# Patient Record
Sex: MALE | Race: BLACK OR AFRICAN AMERICAN | NOT HISPANIC OR LATINO | Employment: UNEMPLOYED | URBAN - METROPOLITAN AREA
[De-identification: names, ages, dates, MRNs, and addresses within clinical notes are randomized per-mention and may not be internally consistent; named-entity substitution may affect disease eponyms.]

---

## 2017-04-26 ENCOUNTER — HOSPITAL ENCOUNTER (EMERGENCY)
Facility: HOSPITAL | Age: 22
Discharge: HOME | End: 2017-04-26
Attending: EMERGENCY MEDICINE
Payer: MEDICAID

## 2017-04-26 VITALS
OXYGEN SATURATION: 97 % | HEART RATE: 69 BPM | BODY MASS INDEX: 39.17 KG/M2 | WEIGHT: 315 LBS | DIASTOLIC BLOOD PRESSURE: 83 MMHG | RESPIRATION RATE: 19 BRPM | HEIGHT: 75 IN | TEMPERATURE: 99 F | SYSTOLIC BLOOD PRESSURE: 144 MMHG

## 2017-04-26 DIAGNOSIS — R51.9 HEADACHE IN FRONT OF HEAD: ICD-10-CM

## 2017-04-26 DIAGNOSIS — J02.9 ACUTE PHARYNGITIS, UNSPECIFIED ETIOLOGY: Primary | ICD-10-CM

## 2017-04-26 PROCEDURE — 99283 EMERGENCY DEPT VISIT LOW MDM: CPT

## 2017-04-26 RX ORDER — IBUPROFEN 600 MG/1
600 TABLET ORAL EVERY 6 HOURS PRN
Qty: 20 TABLET | Refills: 0 | OUTPATIENT
Start: 2017-04-26 | End: 2018-09-30

## 2017-04-26 RX ORDER — DIPHENHYDRAMINE HCL 25 MG
25 CAPSULE ORAL EVERY 6 HOURS PRN
Qty: 20 CAPSULE | Refills: 0 | COMMUNITY
Start: 2017-04-26 | End: 2017-04-26

## 2017-04-26 RX ORDER — DIPHENHYDRAMINE HCL 25 MG
25 CAPSULE ORAL EVERY 6 HOURS PRN
Qty: 20 CAPSULE | Refills: 0 | Status: SHIPPED | OUTPATIENT
Start: 2017-04-26

## 2017-04-26 NOTE — ED TRIAGE NOTES
Patient comes to the ER with sore throat. Patient states been going on since last night. Frontal region headache.

## 2017-04-26 NOTE — ED PROVIDER NOTES
Encounter Date: 4/26/2017       History     Chief Complaint   Patient presents with    Sore Throat     and headache since last night     Review of patient's allergies indicates:  No Known Allergies  HPI   This 21-year-old male presents to emergency room complaining of a sore throat that began last night.  He also has some pain in the front of his head.  He denies fever there is no history of cough rhinorrhea.  He is without other injuries or problems.  The symptoms are moderately severe and constant.  Past Medical History:   Diagnosis Date    ADHD (attention deficit hyperactivity disorder)     Obesity      History reviewed. No pertinent surgical history.  History reviewed. No pertinent family history.  Social History   Substance Use Topics    Smoking status: Never Smoker    Smokeless tobacco: None    Alcohol use No     Review of Systems  The patient was questioned specifically with regard to the following.  General: Fever, chills, sweats. Neuro: Headache. Eyes: eye problems. ENT: Ear pain, sore throat. Cardiovascular: Chest pain. Respiratory: Cough, shortness of breath. Gastrointestinal: Abdominal pain, vomiting, diarrhea. Genitourinary: Painful urination.  Musculoskeletal: Arm and leg problems. Skin: Rash.  The review of systems was negative except for the following: Sore throat, frontal headache, no neurologic deficits neck pain  Physical Exam   Initial Vitals   BP Pulse Resp Temp SpO2   04/26/17 1714 04/26/17 1714 04/26/17 1714 04/26/17 1714 04/26/17 1714   144/83 69 19 98.9 °F (37.2 °C) 97 %     Physical Exam  The patient was examined specifically for the following:   General:No significant distress, Good color, Warm and dry. Head and neck:Scalp atraumatic, Neck supple. Neurological:Appropriate conversation, Gross motor deficits. Eyes:Conjugate gaze, Clear corneas. ENT: No epistaxis. Cardiac: Regular rate and rhythm, Grossly normal heart tones. Pulmonary: Wheezing, Rales. Gastrointestinal: Abdominal  tenderness, Abdominal distention. Musculoskeletal: Extremity deformity, Apparent pain with range of motion of the joints. Skin: Rash.   The findings on examination were normal except for the following: The pharynx is slightly red.  There are no exudates.  There is no adenopathy.  The lungs are clear the heart tones are normally abdomen is soft.  The neck is supple.  Mental status examination, cranial nerves, motor and sensory examination are normal.  ED Course   Procedures  Labs Reviewed - No data to display       Medical decision making: Given the above I believe this patient is a viral pharyngitis.  I will treat with ibuprofen and throat lozenges.  I will have him follow up with primary care.  I'll advise lots of liquids.  We will have him return if he gets worse or if new problems develop.  I'll also use Benadryl.  I doubt streptococcal pharyngitis.                       ED Course     Clinical Impression:   The primary encounter diagnosis was Acute pharyngitis, unspecified etiology. A diagnosis of Headache in front of head was also pertinent to this visit.          Trent Santana MD  04/26/17 2034

## 2017-04-26 NOTE — ED AVS SNAPSHOT
[facility]  [place] [address]               Jesus Bishop   2017  5:23 PM   ED    Description:  Male : 1995   Department:  [facility]           Your Care was Coordinated By:     Provider Role From To    Trent Santana MD Attending Provider [id number] --      Reason for Visit     Sore Throat           Diagnoses this Visit        Comments    Acute pharyngitis, unspecified etiology    -  Primary     Headache in front of head           ED Disposition     None           To Do List           Follow-up Information     Follow up with Damian Howell MD In 3 days.    Specialty:  Pediatrics    Contact information:    [address] [place] [address] [address]  [address]  [place] [address]  [phone]         These Medications        Disp Refills Start End    ibuprofen (ADVIL,MOTRIN) 600 MG tablet 20 tablet 0 2017     Take 1 tablet (600 mg total) by mouth every 6 (six) hours as needed for Pain. - Oral      PURCHASE these Medications (No prescription required)        Start End    diphenhydrAMINE (BENADRYL) 25 mg capsule 2017     Sig - Route: Take 1 each (25 mg total) by mouth every 6 (six) hours as needed (Sore throat). - Oral    Class: OTC      [facility] On Call     [facility] On Call Nurse Care Line - 24/7 Assistance  Unless otherwise directed by your provider, please contact [facility], our nurse care line that is available for 24/7 assistance.     Registered nurses in the [facility] provide: appointment scheduling, clinical advisement, health education, and other advisory services.  Call: [phone] (toll free)               Medications           Message regarding Medications     Verify the changes and/or additions to your medication regime listed below are the same as discussed with your clinician today.  If any of these changes or additions are incorrect, please notify your healthcare provider.        START  "taking these NEW medications        Refills    ibuprofen (ADVIL,MOTRIN) 600 MG tablet 0    Sig: Take 1 tablet (600 mg total) by mouth every 6 (six) hours as needed for Pain.    Class: Print    Route: Oral    diphenhydrAMINE (BENADRYL) 25 mg capsule 0    Sig: Take 1 each (25 mg total) by mouth every 6 (six) hours as needed (Sore throat).    Class: OTC    Route: Oral      STOP taking these medications     naproxen (NAPROSYN) 500 MG tablet Take 1 tablet (500 mg total) by mouth 2 (two) times daily with meals.           Verify that the below list of medications is an accurate representation of the medications you are currently taking.  If none reported, the list may be blank. If incorrect, please contact your healthcare provider. Carry this list with you in case of emergency.           Current Medications     diphenhydrAMINE (BENADRYL) 25 mg capsule Take 1 each (25 mg total) by mouth every 6 (six) hours as needed (Sore throat).    ibuprofen (ADVIL,MOTRIN) 600 MG tablet Take 1 tablet (600 mg total) by mouth every 6 (six) hours as needed for Pain.           Clinical Reference Information           Your Vitals Were     BP Pulse Temp Resp Height Weight    144/83 (BP Location: Right arm, Patient Position: Sitting) 69 98.9 °F (37.2 °C) (Oral) 19 6' 3" (1.905 m) 145.2 kg (320 lb)    SpO2 BMI             97% 40 kg/m2         Allergies as of 4/26/2017     No Known Allergies      Immunizations Administered on Date of Encounter - 4/26/2017     None      ED Micro, Lab, POCT     None      ED Imaging Orders     None        Discharge Instructions       Lots of liquids.  Please return immediately if you get worse or if new problems develop.  Please make an appointment to see your primary care doctor this week.  Rest.  Use also throat lozenges.    [facility] Sign-Up     Activating your [facility] account is as easy as 1-2-3!     1) Visit [email or web address], select Sign Up Now, enter this activation code and your date of birth, then select " Next.  [email or web address]  Expires: 6/10/2017  5:28 PM      2) Create a username and password to use when you visit [facility] in the future and select a security question in case you lose your password and select Next.    3) Enter your e-mail address and click Sign Up!    Additional Information  If you have questions, please e-mail [email or web address] or call [phone] to talk to our [employer] staff. Remember, [facility] is NOT to be used for urgent needs. For medical emergencies, dial 911.          [facility] complies with applicable Federal civil rights laws and does not discriminate on the basis of race, color, national origin, age, disability, or sex.        Language Assistance Services     ATTENTION: Language assistance services are available, free of charge. Please call [phone].      ATENCIÓN: Si habla español, tiene a small disposición servicios gratuitos de asistencia lingüística. Llame al [phone].     [facility] Ý: N?u b?n nói Ti?ng Vi?t, có các d?ch v? h? tr? ngôn ng? mi?n phí dành cho b?n. G?i s? [phone].

## 2017-04-26 NOTE — DISCHARGE INSTRUCTIONS
Lots of liquids.  Please return immediately if you get worse or if new problems develop.  Please make an appointment to see your primary care doctor this week.  Rest.  Use also throat lozenges.

## 2017-12-12 ENCOUNTER — HOSPITAL ENCOUNTER (EMERGENCY)
Facility: HOSPITAL | Age: 22
Discharge: LEFT BEFORE DISCHARGE | End: 2017-12-12
Attending: EMERGENCY MEDICINE
Payer: MEDICAID

## 2017-12-12 VITALS
HEART RATE: 53 BPM | OXYGEN SATURATION: 98 % | HEIGHT: 75 IN | WEIGHT: 284 LBS | BODY MASS INDEX: 35.31 KG/M2 | TEMPERATURE: 98 F | RESPIRATION RATE: 18 BRPM | SYSTOLIC BLOOD PRESSURE: 138 MMHG | DIASTOLIC BLOOD PRESSURE: 85 MMHG

## 2017-12-12 DIAGNOSIS — M25.511 SHOULDER PAIN, RIGHT: ICD-10-CM

## 2017-12-12 PROCEDURE — 99900041 HC LEFT WITHOUT BEING SEEN- EMERGENCY

## 2018-09-30 ENCOUNTER — HOSPITAL ENCOUNTER (EMERGENCY)
Facility: HOSPITAL | Age: 23
Discharge: HOME | End: 2018-09-30
Attending: EMERGENCY MEDICINE
Payer: MEDICAID

## 2018-09-30 VITALS
BODY MASS INDEX: 34.82 KG/M2 | HEART RATE: 56 BPM | DIASTOLIC BLOOD PRESSURE: 105 MMHG | HEIGHT: 75 IN | WEIGHT: 280 LBS | SYSTOLIC BLOOD PRESSURE: 151 MMHG | OXYGEN SATURATION: 98 % | TEMPERATURE: 99 F | RESPIRATION RATE: 18 BRPM

## 2018-09-30 DIAGNOSIS — R03.0 ELEVATED BLOOD PRESSURE READING: ICD-10-CM

## 2018-09-30 DIAGNOSIS — H92.01 RIGHT EAR PAIN: ICD-10-CM

## 2018-09-30 DIAGNOSIS — I10 HTN (HYPERTENSION): ICD-10-CM

## 2018-09-30 DIAGNOSIS — H60.91 OTITIS EXTERNA OF RIGHT EAR, UNSPECIFIED CHRONICITY, UNSPECIFIED TYPE: Primary | ICD-10-CM

## 2018-09-30 PROCEDURE — 93005 ELECTROCARDIOGRAM TRACING: CPT

## 2018-09-30 PROCEDURE — 99284 EMERGENCY DEPT VISIT MOD MDM: CPT

## 2018-09-30 PROCEDURE — 93010 ELECTROCARDIOGRAM REPORT: CPT | Mod: ,,, | Performed by: INTERNAL MEDICINE

## 2018-09-30 PROCEDURE — 25000003 PHARM REV CODE 250: Performed by: PHYSICIAN ASSISTANT

## 2018-09-30 RX ORDER — IBUPROFEN 600 MG/1
600 TABLET ORAL
Status: COMPLETED | OUTPATIENT
Start: 2018-09-30 | End: 2018-09-30

## 2018-09-30 RX ORDER — IBUPROFEN 600 MG/1
600 TABLET ORAL EVERY 6 HOURS PRN
Qty: 20 TABLET | Refills: 0 | Status: SHIPPED | OUTPATIENT
Start: 2018-09-30 | End: 2018-10-05

## 2018-09-30 RX ORDER — CIPROFLOXACIN AND DEXAMETHASONE 3; 1 MG/ML; MG/ML
4 SUSPENSION/ DROPS AURICULAR (OTIC)
Status: COMPLETED | OUTPATIENT
Start: 2018-09-30 | End: 2018-09-30

## 2018-09-30 RX ORDER — ACETAMINOPHEN 500 MG
500 TABLET ORAL EVERY 4 HOURS PRN
Qty: 20 TABLET | Refills: 0 | Status: SHIPPED | OUTPATIENT
Start: 2018-09-30 | End: 2018-10-05

## 2018-09-30 RX ORDER — CIPROFLOXACIN AND DEXAMETHASONE 3; 1 MG/ML; MG/ML
4 SUSPENSION/ DROPS AURICULAR (OTIC) 2 TIMES DAILY
Qty: 7.5 ML | Refills: 0 | Status: SHIPPED | OUTPATIENT
Start: 2018-09-30 | End: 2018-10-07

## 2018-09-30 RX ADMIN — CIPROFLOXACIN AND DEXAMETHASONE 4 DROP: 3; 1 SUSPENSION/ DROPS AURICULAR (OTIC) at 04:09

## 2018-09-30 RX ADMIN — IBUPROFEN 600 MG: 600 TABLET, FILM COATED ORAL at 04:09

## 2018-09-30 NOTE — DISCHARGE INSTRUCTIONS
Take Tylenol and Ibuprofen for pain and use Ciprodex antibiotic eardrops.     You should follow up with primary care in 2 days. Your blood pressure was high and needs to be rechecked to make sure that you do not need to be on blood pressure medications.  Return to ER for worsening symptoms or as needed.

## 2018-09-30 NOTE — ED TRIAGE NOTES
Pt c/o right ear pain that woke him up at 0200. Denies taking pain medication.  Denies medical hx.

## 2018-09-30 NOTE — ED PROVIDER NOTES
Encounter Date: 9/30/2018       History     Chief Complaint   Patient presents with    Otalgia     Pt c/o right ear pain that woke him up at 0200.  Denies taking pain medication.      CC: Otalgia     HPI:   Patient is a 22-year-old male with history of ADHD and obesity presenting for evaluation of right otalgia that began today the waking him from sleep.  Patient denies hearing loss, otorrhea, swimming or bathing recently.  Patient states pain is worse whenever he touches his ear.  No attempted tx.     Patient walked approximately 1 hour to get here PTA. Denies history of HTN, CP, SOB, dizziness, lightheadedness, HA, abdominal pain.           Review of patient's allergies indicates:  No Known Allergies  Past Medical History:   Diagnosis Date    ADHD (attention deficit hyperactivity disorder)     Obesity      History reviewed. No pertinent surgical history.  Family History   Problem Relation Age of Onset    Diabetes Mother      Social History     Tobacco Use    Smoking status: Never Smoker    Smokeless tobacco: Never Used   Substance Use Topics    Alcohol use: No    Drug use: Yes     Types: Marijuana     Review of Systems   Constitutional: Negative for chills and fever.   HENT: Positive for ear pain. Negative for congestion, ear discharge, rhinorrhea and sore throat.    Eyes: Negative for redness.   Respiratory: Negative for shortness of breath.    Cardiovascular: Negative for chest pain.   Gastrointestinal: Negative for abdominal pain, nausea and vomiting.   Musculoskeletal: Negative for back pain and neck pain.   Skin: Negative for rash.   Neurological: Negative for dizziness, light-headedness and headaches.   Psychiatric/Behavioral: Negative for suicidal ideas.       Physical Exam     Initial Vitals [09/30/18 0303]   BP Pulse Resp Temp SpO2   (!) 159/102 (!) 52 16 98.6 °F (37 °C) 98 %      MAP       --         Physical Exam    Nursing note and vitals reviewed.  Constitutional: He appears well-developed and  well-nourished. No distress.   HENT:   Head: Normocephalic.   Right Ear: Hearing, tympanic membrane and external ear normal. Tympanic membrane is not erythematous.   Left Ear: Hearing, tympanic membrane and external ear normal. Tympanic membrane is not erythematous.   Nose: Nose normal.   Mouth/Throat: Oropharynx is clear and moist. No oropharyngeal exudate.   Swelling and erythema of R ear canal with whitish yellow drainage. Pain with insertion of speculum and + tragal TTP and preauricular region   Eyes: Conjunctivae are normal.   Neck: Neck supple.   Cardiovascular: Normal rate and regular rhythm. Exam reveals no gallop and no friction rub.    No murmur heard.  Pulmonary/Chest: Breath sounds normal. No respiratory distress. He has no wheezes. He has no rhonchi. He has no rales.   Lymphadenopathy:     He has no cervical adenopathy.   Neurological: He is alert.   Skin: Skin is warm and dry. No rash noted.   Psychiatric: He has a normal mood and affect.         ED Course   Procedures  Labs Reviewed - No data to display       Imaging Results    None          Medical Decision Making:   Initial Assessment:   A 22-year-old male with history of obesity presenting for evaluation of constant right-sided ear pain that awoke him from sleep prior to arrival.  Exam findings consistent with otitis externa.  No evidence of otitis media, mastoiditis.  Ciprodex applied in the ED.  Patient is elevated blood pressure at this visit.  Patient walked approximately 1 hr from home to come to the ER today.  He denies chest pain, shortness of breath, dizziness, lightheadedness, headache.  Patient has not had history of hypertension in the past.  Review of previous visits does not show markedly elevated blood pressure.  Will have patient follow up with primary care for repeat blood pressure check and for possible initiation of antihypertensive medications.  ER return precautions discussed worsening symptoms or as needed                       Clinical Impression:   The primary encounter diagnosis was Otitis externa of right ear, unspecified chronicity, unspecified type. Diagnoses of Right ear pain, Elevated blood pressure reading, and HTN (hypertension) were also pertinent to this visit.                             Malinda Batres PA-C  09/30/18 0605

## 2021-07-27 ENCOUNTER — HOSPITAL ENCOUNTER (EMERGENCY)
Facility: HOSPITAL | Age: 26
Discharge: HOME | End: 2021-07-27
Attending: EMERGENCY MEDICINE
Payer: MEDICAID

## 2021-07-27 VITALS
TEMPERATURE: 98 F | DIASTOLIC BLOOD PRESSURE: 95 MMHG | RESPIRATION RATE: 18 BRPM | HEART RATE: 78 BPM | HEIGHT: 75 IN | WEIGHT: 315 LBS | BODY MASS INDEX: 39.17 KG/M2 | SYSTOLIC BLOOD PRESSURE: 163 MMHG | OXYGEN SATURATION: 98 %

## 2021-07-27 DIAGNOSIS — R52 PAIN: ICD-10-CM

## 2021-07-27 DIAGNOSIS — S70.02XA CONTUSION OF LEFT HIP, INITIAL ENCOUNTER: Primary | ICD-10-CM

## 2021-07-27 LAB
ALBUMIN SERPL BCP-MCNC: 4.5 G/DL (ref 3.5–5.2)
ALP SERPL-CCNC: 88 U/L (ref 55–135)
ALT SERPL W/O P-5'-P-CCNC: 38 U/L (ref 10–44)
ANION GAP SERPL CALC-SCNC: 7 MMOL/L (ref 8–16)
AST SERPL-CCNC: 31 U/L (ref 10–40)
BASOPHILS # BLD AUTO: 0.03 K/UL (ref 0–0.2)
BASOPHILS NFR BLD: 0.3 % (ref 0–1.9)
BILIRUB SERPL-MCNC: 0.4 MG/DL (ref 0.1–1)
BILIRUB UR QL STRIP: NEGATIVE
BUN SERPL-MCNC: 12 MG/DL (ref 6–20)
CALCIUM SERPL-MCNC: 9.9 MG/DL (ref 8.7–10.5)
CHLORIDE SERPL-SCNC: 106 MMOL/L (ref 95–110)
CLARITY UR: CLEAR
CO2 SERPL-SCNC: 28 MMOL/L (ref 23–29)
COLOR UR: YELLOW
CREAT SERPL-MCNC: 1 MG/DL (ref 0.5–1.4)
DIFFERENTIAL METHOD: NORMAL
EOSINOPHIL # BLD AUTO: 0.1 K/UL (ref 0–0.5)
EOSINOPHIL NFR BLD: 0.7 % (ref 0–8)
ERYTHROCYTE [DISTWIDTH] IN BLOOD BY AUTOMATED COUNT: 14 % (ref 11.5–14.5)
EST. GFR  (AFRICAN AMERICAN): >60 ML/MIN/1.73 M^2
EST. GFR  (NON AFRICAN AMERICAN): >60 ML/MIN/1.73 M^2
GLUCOSE SERPL-MCNC: 82 MG/DL (ref 70–110)
GLUCOSE UR QL STRIP: NEGATIVE
HCT VFR BLD AUTO: 50.2 % (ref 40–54)
HGB BLD-MCNC: 16.4 G/DL (ref 14–18)
HGB UR QL STRIP: NEGATIVE
IMM GRANULOCYTES # BLD AUTO: 0.03 K/UL (ref 0–0.04)
IMM GRANULOCYTES NFR BLD AUTO: 0.3 % (ref 0–0.5)
KETONES UR QL STRIP: NEGATIVE
LEUKOCYTE ESTERASE UR QL STRIP: NEGATIVE
LYMPHOCYTES # BLD AUTO: 2.8 K/UL (ref 1–4.8)
LYMPHOCYTES NFR BLD: 31 % (ref 18–48)
MCH RBC QN AUTO: 27 PG (ref 27–31)
MCHC RBC AUTO-ENTMCNC: 32.7 G/DL (ref 32–36)
MCV RBC AUTO: 83 FL (ref 82–98)
MONOCYTES # BLD AUTO: 0.5 K/UL (ref 0.3–1)
MONOCYTES NFR BLD: 5.9 % (ref 4–15)
NEUTROPHILS # BLD AUTO: 5.6 K/UL (ref 1.8–7.7)
NEUTROPHILS NFR BLD: 61.8 % (ref 38–73)
NITRITE UR QL STRIP: NEGATIVE
NRBC BLD-RTO: 0 /100 WBC
PH UR STRIP: 7 [PH] (ref 5–8)
PLATELET # BLD AUTO: 288 K/UL (ref 150–450)
PMV BLD AUTO: 10.1 FL (ref 9.2–12.9)
POTASSIUM SERPL-SCNC: 4.2 MMOL/L (ref 3.5–5.1)
PROT SERPL-MCNC: 8.2 G/DL (ref 6–8.4)
PROT UR QL STRIP: ABNORMAL
RBC # BLD AUTO: 6.07 M/UL (ref 4.6–6.2)
SODIUM SERPL-SCNC: 141 MMOL/L (ref 136–145)
SP GR UR STRIP: 1.03 (ref 1–1.03)
URN SPEC COLLECT METH UR: ABNORMAL
UROBILINOGEN UR STRIP-ACNC: NEGATIVE EU/DL
WBC # BLD AUTO: 9.13 K/UL (ref 3.9–12.7)

## 2021-07-27 PROCEDURE — 25000003 PHARM REV CODE 250: Performed by: EMERGENCY MEDICINE

## 2021-07-27 PROCEDURE — 81003 URINALYSIS AUTO W/O SCOPE: CPT | Performed by: EMERGENCY MEDICINE

## 2021-07-27 PROCEDURE — 85025 COMPLETE CBC W/AUTO DIFF WBC: CPT | Performed by: EMERGENCY MEDICINE

## 2021-07-27 PROCEDURE — 80053 COMPREHEN METABOLIC PANEL: CPT | Performed by: EMERGENCY MEDICINE

## 2021-07-27 PROCEDURE — 99285 EMERGENCY DEPT VISIT HI MDM: CPT

## 2021-07-27 RX ORDER — CYCLOBENZAPRINE HCL 10 MG
10 TABLET ORAL 3 TIMES DAILY PRN
Qty: 15 TABLET | Refills: 0 | Status: SHIPPED | OUTPATIENT
Start: 2021-07-27 | End: 2021-08-01

## 2021-07-27 RX ORDER — IBUPROFEN 800 MG/1
800 TABLET ORAL EVERY 6 HOURS PRN
Qty: 20 TABLET | Refills: 0 | Status: SHIPPED | OUTPATIENT
Start: 2021-07-27

## 2021-07-27 RX ORDER — CYCLOBENZAPRINE HCL 10 MG
10 TABLET ORAL
Status: COMPLETED | OUTPATIENT
Start: 2021-07-27 | End: 2021-07-27

## 2021-07-27 RX ORDER — KETOROLAC TROMETHAMINE 30 MG/ML
30 INJECTION, SOLUTION INTRAMUSCULAR; INTRAVENOUS
Status: DISCONTINUED | OUTPATIENT
Start: 2021-07-27 | End: 2021-07-27 | Stop reason: HOSPADM

## 2021-07-27 RX ADMIN — CYCLOBENZAPRINE 10 MG: 10 TABLET, FILM COATED ORAL at 01:07

## 2022-07-02 ENCOUNTER — HOSPITAL ENCOUNTER (EMERGENCY)
Facility: HOSPITAL | Age: 27
Discharge: HOME | End: 2022-07-02
Attending: EMERGENCY MEDICINE
Payer: MEDICAID

## 2022-07-02 VITALS
TEMPERATURE: 98 F | SYSTOLIC BLOOD PRESSURE: 128 MMHG | HEIGHT: 75 IN | HEART RATE: 57 BPM | BODY MASS INDEX: 39.17 KG/M2 | DIASTOLIC BLOOD PRESSURE: 76 MMHG | OXYGEN SATURATION: 98 % | RESPIRATION RATE: 14 BRPM | WEIGHT: 315 LBS

## 2022-07-02 DIAGNOSIS — Z20.2 TRICHOMONAS CONTACT: Primary | ICD-10-CM

## 2022-07-02 LAB
BILIRUBIN, POC UA: NEGATIVE
BLOOD, POC UA: NEGATIVE
CLARITY, POC UA: CLEAR
COLOR, POC UA: YELLOW
GLUCOSE, POC UA: NEGATIVE
KETONES, POC UA: NEGATIVE
LEUKOCYTE EST, POC UA: NEGATIVE
NITRITE, POC UA: NEGATIVE
PH UR STRIP: 7 [PH]
PROTEIN, POC UA: NEGATIVE
SPECIFIC GRAVITY, POC UA: >=1.03
UROBILINOGEN, POC UA: 0.2 E.U./DL

## 2022-07-02 PROCEDURE — 25000003 PHARM REV CODE 250: Mod: ER | Performed by: EMERGENCY MEDICINE

## 2022-07-02 PROCEDURE — 99283 EMERGENCY DEPT VISIT LOW MDM: CPT | Mod: 25,ER

## 2022-07-02 RX ORDER — METRONIDAZOLE 500 MG/1
2000 TABLET ORAL
Status: COMPLETED | OUTPATIENT
Start: 2022-07-02 | End: 2022-07-02

## 2022-07-02 RX ADMIN — METRONIDAZOLE 2000 MG: 500 TABLET ORAL at 10:07

## 2022-07-03 NOTE — ED PROVIDER NOTES
Encounter Date: 7/2/2022    SCRIBE #1 NOTE: I, Shanon Lopez, am scribing for, and in the presence of,  William Elliott MD. I have scribed the following portions of the note - Other sections scribed: HPI, ROS, PE.       History     Chief Complaint   Patient presents with    Exposure to STD     No symtoms, girlfriend has trich     26 y.o. male with no pertinent medical history who presents to the ED with chief complaint of request for asymptomatic treatment of trichomoniasis. Patient's girlfriend tested positive for trichomoniasis and he would like to be treated. Denies dysuria, penile discharge, or abdominal pain. NKDA. Denies consuming EtOH in the last 3 days. No further complaints at this time.     The history is provided by the patient. No  was used.     Review of patient's allergies indicates:  No Known Allergies  Past Medical History:   Diagnosis Date    ADHD (attention deficit hyperactivity disorder)     Obesity      No past surgical history on file.  Family History   Problem Relation Age of Onset    Diabetes Mother      Social History     Tobacco Use    Smoking status: Never Smoker    Smokeless tobacco: Never Used   Substance Use Topics    Alcohol use: No    Drug use: Yes     Types: Marijuana     Review of Systems   Constitutional: Negative for diaphoresis and fever.   HENT: Negative for sore throat.    Eyes: Negative for pain.   Respiratory: Negative for shortness of breath.    Cardiovascular: Negative for chest pain.   Gastrointestinal: Negative for abdominal pain and nausea.   Genitourinary: Negative for dysuria and penile discharge.   Musculoskeletal: Negative for back pain.   Skin: Negative for rash.   Neurological: Negative for weakness.   All other systems reviewed and are negative.      Physical Exam     Initial Vitals [07/02/22 2045]   BP Pulse Resp Temp SpO2   (!) 147/78 (!) 55 16 98.1 °F (36.7 °C) 98 %      MAP       --         Physical Exam    Nursing note and vitals  reviewed.  Constitutional: He is not diaphoretic. No distress.   HENT:   Head: Normocephalic and atraumatic.   Mouth/Throat: Oropharynx is clear and moist.   Eyes: Conjunctivae and EOM are normal. No scleral icterus.   Neck: Neck supple. No tracheal deviation present.   Normal range of motion.  Cardiovascular: Normal rate, regular rhythm and intact distal pulses.   Pulmonary/Chest: Breath sounds normal. No stridor. No respiratory distress.   Abdominal: Abdomen is soft. He exhibits no distension. There is no abdominal tenderness.   Musculoskeletal:         General: No tenderness or edema. Normal range of motion.      Cervical back: Normal range of motion and neck supple.     Neurological: He is alert. He has normal strength. No cranial nerve deficit or sensory deficit. GCS score is 15. GCS eye subscore is 4. GCS verbal subscore is 5. GCS motor subscore is 6.   Skin: Skin is warm and dry.   Psychiatric: He has a normal mood and affect.         ED Course   Procedures  Labs Reviewed   POCT URINALYSIS W/O SCOPE - Abnormal; Notable for the following components:       Result Value    Spec Grav UA >=1.030 (*)     All other components within normal limits   POCT URINALYSIS W/O SCOPE          Imaging Results    None          Medications   metroNIDAZOLE tablet 2,000 mg (has no administration in time range)     Medical Decision Making:   History:   Old Medical Records: I decided to obtain old medical records.  Differential Diagnosis:   Includes but is not limited to: UTI, STI, Trichomoniasis   Clinical Tests:   Lab Tests: Ordered and Reviewed          Scribe Attestation:   Scribe #1: I performed the above scribed service and the documentation accurately describes the services I performed. I attest to the accuracy of the note.        ED Course as of 07/02/22 2211   Sat Jul 02, 2022 2209 Patient is afebrile and in no acute distress at time history and physical.  He reports his sexual partner tested positive for Trichomonas.  He  denies current symptoms.  Vitals within acceptable ranges.  He has a benign abdominal exam.  He denies alcohol use is within past 2 days.  Patient given 2 g Flagyl in the emergency department.  He is clinically stable fit for discharge to follow up with his primary physician.  Counseled not to drink alcohol for the next 3 days. counseled on supportive care, appropriate medication usage, concerning symptoms for which to return to ER and the importance of follow up. Understanding and agreement with treatment plan was expressed.  [SG]      ED Course User Index  [SG] William Elliott MD        This chart was completed using dictation software, as a result there may be some transcription errors.      I, William Elliott , personally performed the services described in this documentation.  All medical record entries made by the scribe were at my direction and in my presence.  I have reviewed the chart and agree that the record reflects my personal performance and is accurate and complete.  Clinical Impression:   Final diagnoses:  [Z20.2] Trichomonas contact (Primary)          ED Disposition Condition    Discharge Stable        ED Prescriptions     None        Follow-up Information     Follow up With Specialties Details Why Contact Info    Your Primary Physician  Schedule an appointment as soon as possible for a visit   Make an appointment to see your primary care physician as soon as possible for follow-up           William Elliott MD  07/03/22 0590

## 2022-07-03 NOTE — DISCHARGE INSTRUCTIONS
You have received treatment for Trichomonas in the emergency department.  Do not drink alcohol for the next 3 days.  Practice safe sex.  Schedule follow-up with your primary physician.  Return to the emergency department for any new, worsening or significantly concerning symptoms.    Thank you for coming to our Emergency Department today. It is important to remember that some problems are difficult to diagnose and may not be found during your first visit. Be sure to follow up with your primary care doctor and review any labs/imaging that was performed with them. If you do not have a primary care doctor, you may contact the one listed on your discharge paperwork or you may also call the [facility] Appointment Desk at [phone] to schedule an appointment with one.     All medications may potentially have side effects and it is impossible to predict which medications may give you side effects. If you feel that you are having a negative effect of any medication you should immediately stop taking them and seek medical attention.    Return to the ER with any questions/concerns, new/concerning symptoms, worsening or failure to improve. Do not drive or make any important decisions for 24 hours if you have received any pain medications, sedatives or mood altering drugs during your ER visit.